# Patient Record
Sex: MALE | Race: WHITE | URBAN - METROPOLITAN AREA
[De-identification: names, ages, dates, MRNs, and addresses within clinical notes are randomized per-mention and may not be internally consistent; named-entity substitution may affect disease eponyms.]

---

## 2019-03-30 ENCOUNTER — EMERGENCY (EMERGENCY)
Facility: HOSPITAL | Age: 54
LOS: 1 days | Discharge: HOME | End: 2019-03-30
Admitting: EMERGENCY MEDICINE
Payer: MEDICARE

## 2019-03-30 VITALS
HEART RATE: 72 BPM | OXYGEN SATURATION: 96 % | DIASTOLIC BLOOD PRESSURE: 80 MMHG | TEMPERATURE: 98 F | SYSTOLIC BLOOD PRESSURE: 135 MMHG | RESPIRATION RATE: 16 BRPM

## 2019-03-30 PROCEDURE — 99283 EMERGENCY DEPT VISIT LOW MDM: CPT

## 2019-03-30 NOTE — ED PROVIDER NOTE - CLINICAL SUMMARY MEDICAL DECISION MAKING FREE TEXT BOX
Alcohol intoxication. Admit to ETOH usage. No signs of trauma. Will observe for sobriety and reassess frequently.

## 2019-03-30 NOTE — ED ADULT TRIAGE NOTE - CHIEF COMPLAINT QUOTE
BIBa, arrived ambulatory with mild unsteady gait, for alcohol intoxication. Pt admits to alcohol use. +AOB, +slurred speech. States " I drink every day."

## 2019-03-30 NOTE — ED PROVIDER NOTE - OBJECTIVE STATEMENT
52 y/o male BIB EMS for ETOH intoxication. Admits to heavy ETOH use today, no other complaints.  Placed in stretcher and quickly falls asleep.  Unable to cooperate with remainder of history due to clinical condition/AMS. 54 y/o male BIB EMS for ETOH intoxication. Admits to heavy ETOH use today, no other complaints. +Slurred speech. Pt states "I drink every day." Unable to cooperate with remainder of history due to clinical condition/AMS.

## 2019-03-31 VITALS
SYSTOLIC BLOOD PRESSURE: 101 MMHG | RESPIRATION RATE: 18 BRPM | HEART RATE: 60 BPM | TEMPERATURE: 98 F | DIASTOLIC BLOOD PRESSURE: 63 MMHG | OXYGEN SATURATION: 94 %

## 2019-04-03 DIAGNOSIS — F10.129 ALCOHOL ABUSE WITH INTOXICATION, UNSPECIFIED: ICD-10-CM

## 2019-04-03 DIAGNOSIS — R41.82 ALTERED MENTAL STATUS, UNSPECIFIED: ICD-10-CM

## 2019-04-03 DIAGNOSIS — Z88.0 ALLERGY STATUS TO PENICILLIN: ICD-10-CM
